# Patient Record
Sex: MALE | Race: WHITE
[De-identification: names, ages, dates, MRNs, and addresses within clinical notes are randomized per-mention and may not be internally consistent; named-entity substitution may affect disease eponyms.]

---

## 2018-04-17 ENCOUNTER — HOSPITAL ENCOUNTER (EMERGENCY)
Dept: HOSPITAL 25 - UCCORT | Age: 68
Discharge: HOME | End: 2018-04-17
Payer: COMMERCIAL

## 2018-04-17 VITALS — SYSTOLIC BLOOD PRESSURE: 131 MMHG | DIASTOLIC BLOOD PRESSURE: 75 MMHG

## 2018-04-17 DIAGNOSIS — R09.81: ICD-10-CM

## 2018-04-17 DIAGNOSIS — J20.9: Primary | ICD-10-CM

## 2018-04-17 PROCEDURE — 99212 OFFICE O/P EST SF 10 MIN: CPT

## 2018-04-17 PROCEDURE — 87502 INFLUENZA DNA AMP PROBE: CPT

## 2018-04-17 PROCEDURE — G0463 HOSPITAL OUTPT CLINIC VISIT: HCPCS

## 2018-04-17 NOTE — UC
Respiratory Complaint HPI





- HPI Summary


HPI Summary: 





66 yo male with <48 hr hx of f/c, nasal congestion and productive cough


no CP or SOB


no n/v/d











- History of Current Complaint


Chief Complaint: UCGeneralIllness


Stated Complaint: FEVER


Time Seen by Provider: 04/17/18 17:45


Hx Obtained From: Patient


Onset/Duration: Sudden Onset, Gradual Onset, Lasting Minutes


Timing: Constant


Severity Initially: Mild


Severity Currently: Moderate


Pain Intensity: 2


Pain Scale Used: 0-10 Numeric


Character: Cough: Nonproductive


Aggravating Factors: Exertion - talking


Associated Signs And Symptoms: Positive: Fever, Chills, Nasal Congestion





- Allergies/Home Medications


Allergies/Adverse Reactions: 


 Allergies











Allergy/AdvReac Type Severity Reaction Status Date / Time


 


No Known Allergies Allergy   Verified 04/17/18 17:50














PMH/Surg Hx/FS Hx/Imm Hx


Previously Healthy: Yes


Respiratory History: Pneumonia - as child





- Surgical History


Surgical History: Yes


Surgery Procedure, Year, and Place: VENTRAL HERNIA REPAIR





- Family History


Known Family History: 


   Negative: Other - NO JOINT LAXITY





- Social History


Alcohol Use: Weekly


Substance Use Type: None


Smoking Status (MU): Never Smoked Tobacco





Review of Systems


Constitutional: Fever, Chills, Fatigue


Skin: Negative


Eyes: Negative


ENT: Nasal Discharge, Sinus Congestion


Respiratory: Cough


Cardiovascular: Negative


Gastrointestinal: Negative


Genitourinary: Negative


Motor: Negative


Neurovascular: Negative


Musculoskeletal: Negative


Neurological: Negative


Psychological: Negative


Is Patient Immunocompromised?: No


All Other Systems Reviewed And Are Negative: Yes





Physical Exam


Triage Information Reviewed: Yes


Appearance: Well-Appearing, No Pain Distress, Well-Nourished


Vital Signs: 


 Initial Vital Signs











Temp  98.7 F   04/17/18 17:50


 


Pulse  76   04/17/18 17:50


 


Resp  16   04/17/18 17:50


 


BP  131/75   04/17/18 17:50


 


Pulse Ox  99   04/17/18 17:50











Vital Signs Reviewed: Yes


Eyes: Positive: Conjunctiva Clear


ENT: Positive: Hearing grossly normal, Nasal congestion, TMs normal, Hoarse 

voice.  Negative: Nasal drainage, Tonsillar swelling, Tonsillar exudate, Trismus

, Muffled voice


Neck: Positive: Supple


Respiratory: Positive: Lungs clear, Normal breath sounds, No respiratory 

distress, No accessory muscle use, Rhonchi - with forced expiration


Cardiovascular: Positive: RRR, No Murmur


Musculoskeletal: Positive: ROM Intact, No Edema


Neurological: Positive: Alert


Psychological Exam: Normal


Skin: Positive: rashes





UC Diagnostic Evaluation





- Laboratory


Pertinent Lab Values Are: WNL - flu (-)


O2 Sat by Pulse Oximetry: 99 - normal/not hypoxic





Respiratory Course/Dx





- Differential Dx/Diagnosis


Provider Diagnoses: acute bronchitis





Discharge





- Sign-Out/Discharge


Documenting (check all that apply): Discharge





- Discharge Plan


Condition: Stable


Disposition: HOME


Prescriptions: 


Amoxicillin PO (*) [Amoxicillin 875 MG (*)] 875 mg PO BID #14 tab


Patient Education Materials:  Acute Bronchitis (ED)


Referrals: 


AUDREY Musa [Primary Care Provider] - If Needed


Additional Instructions: 


recheck in 3-4 days if not improved





- Billing Disposition and Condition


Condition: STABLE


Disposition: HOME